# Patient Record
Sex: MALE
[De-identification: names, ages, dates, MRNs, and addresses within clinical notes are randomized per-mention and may not be internally consistent; named-entity substitution may affect disease eponyms.]

---

## 2019-12-13 ENCOUNTER — HOSPITAL ENCOUNTER (EMERGENCY)
Dept: HOSPITAL 97 - ER | Age: 39
Discharge: HOME | End: 2019-12-13
Payer: COMMERCIAL

## 2019-12-13 VITALS — OXYGEN SATURATION: 98 % | TEMPERATURE: 97.5 F | DIASTOLIC BLOOD PRESSURE: 87 MMHG | SYSTOLIC BLOOD PRESSURE: 121 MMHG

## 2019-12-13 DIAGNOSIS — F17.210: ICD-10-CM

## 2019-12-13 DIAGNOSIS — N13.2: Primary | ICD-10-CM

## 2019-12-13 LAB
ALBUMIN SERPL BCP-MCNC: 4.3 G/DL (ref 3.4–5)
ALP SERPL-CCNC: 58 U/L (ref 45–117)
ALT SERPL W P-5'-P-CCNC: 56 U/L (ref 12–78)
AST SERPL W P-5'-P-CCNC: 21 U/L (ref 15–37)
BUN BLD-MCNC: 20 MG/DL (ref 7–18)
GLUCOSE SERPLBLD-MCNC: 144 MG/DL (ref 74–106)
HCT VFR BLD CALC: 45.4 % (ref 39.6–49)
LIPASE SERPL-CCNC: 280 U/L (ref 73–393)
LYMPHOCYTES # SPEC AUTO: 2.9 K/UL (ref 0.7–4.9)
PMV BLD: 8.2 FL (ref 7.6–11.3)
POTASSIUM SERPL-SCNC: 4.1 MMOL/L (ref 3.5–5.1)
RBC # BLD: 4.66 M/UL (ref 4.33–5.43)

## 2019-12-13 PROCEDURE — 96361 HYDRATE IV INFUSION ADD-ON: CPT

## 2019-12-13 PROCEDURE — 99284 EMERGENCY DEPT VISIT MOD MDM: CPT

## 2019-12-13 PROCEDURE — 80076 HEPATIC FUNCTION PANEL: CPT

## 2019-12-13 PROCEDURE — 74176 CT ABD & PELVIS W/O CONTRAST: CPT

## 2019-12-13 PROCEDURE — 36415 COLL VENOUS BLD VENIPUNCTURE: CPT

## 2019-12-13 PROCEDURE — 96375 TX/PRO/DX INJ NEW DRUG ADDON: CPT

## 2019-12-13 PROCEDURE — 81003 URINALYSIS AUTO W/O SCOPE: CPT

## 2019-12-13 PROCEDURE — 80048 BASIC METABOLIC PNL TOTAL CA: CPT

## 2019-12-13 PROCEDURE — 96366 THER/PROPH/DIAG IV INF ADDON: CPT

## 2019-12-13 PROCEDURE — 85025 COMPLETE CBC W/AUTO DIFF WBC: CPT

## 2019-12-13 PROCEDURE — 96365 THER/PROPH/DIAG IV INF INIT: CPT

## 2019-12-13 PROCEDURE — 83690 ASSAY OF LIPASE: CPT

## 2019-12-13 PROCEDURE — 76377 3D RENDER W/INTRP POSTPROCES: CPT

## 2019-12-13 NOTE — EDPHYS
Physician Documentation                                                                           

 [facility]                                                                 

Name: Darin Rosas                                                                             

Age: 39 yrs                                                                                       

Sex: Male                                                                                         

: 1980                                                                                   

MRN: [id number]                                                                                   

Arrival Date: 2019                                                                          

Time: 12:45                                                                                       

Account#: [id number]                                                                            

Bed 5                                                                                             

Private MD:                                                                                       

ED Physician Jack Eric                                                                      

HPI:                                                                                              

                                                                                             

14:13 This 39 yrs old  Male presents to ER via Ambulatory with complaints of          corina 

      Abdominal Pain.                                                                             

14:13 The patient presents with abdominal pain abdominal distention in the left lower         corina 

      quadrant. Onset: The symptoms/episode began/occurred just prior to arrival. The             

      symptoms radiate to the left flank. Associated signs and symptoms: none. The symptoms       

      are described as sharp. Modifying factors: The symptoms are alleviated by nothing, the      

      symptoms are aggravated by nothing. Severity of pain: At its worst the pain was severe      

      in the emergency department the pain is unchanged. The patient has not experienced          

      similar symptoms in the past.                                                               

                                                                                                  

Historical:                                                                                       

- Allergies:                                                                                      

13:19 No Known Allergies;                                                                     ss  

- Home Meds:                                                                                      

13:19 None [Active];                                                                          ss  

- PMHx:                                                                                           

13:19 None;                                                                                   ss  

- PSHx:                                                                                           

13:19 None;                                                                                   ss  

                                                                                                  

- Immunization history:: Adult Immunizations unknown.                                             

- Social history:: Smoking status: Patient uses tobacco products, smokes one pack                 

  cigarettes per day.                                                                             

- Ebola Screening: : Patient denies exposure to infectious person Patient denies travel           

  to an Ebola-affected area in the 21 days before illness onset.                                  

- Family history:: not pertinent.                                                                 

                                                                                                  

                                                                                                  

ROS:                                                                                              

14:13 Constitutional: Negative for fever, chills, and weight loss, Eyes: Negative for injury, corina 

      pain, redness, and discharge, ENT: Negative for injury, pain, and discharge, Neck:          

      Negative for injury, pain, and swelling, Cardiovascular: Negative for chest pain,           

      palpitations, and edema, Respiratory: Negative for shortness of breath, cough,              

      wheezing, and pleuritic chest pain, Back: Negative for injury and pain, : Negative        

      for injury, bleeding, discharge, and swelling, MS/Extremity: Negative for injury and        

      deformity, Skin: Negative for injury, rash, and discoloration, Neuro: Negative for          

      headache, weakness, numbness, tingling, and seizure, Psych: Negative for depression,        

      anxiety, suicide ideation, homicidal ideation, and hallucinations, Allergy/Immunology:      

      Negative for hives, rash, and allergies, Endocrine: Negative for neck swelling,             

      polydipsia, polyuria, polyphagia, and marked weight changes, Hematologic/Lymphatic:         

      Negative for swollen nodes, abnormal bleeding, and unusual bruising.                        

14:13 Abdomen/GI: Positive for abdominal pain, of the left lower quadrant.                        

                                                                                                  

Exam:                                                                                             

14:13 Constitutional:  This is a well developed, well nourished patient who is awake, alert,  corina 

      and in no acute distress. Head/Face:  Normocephalic, atraumatic. Eyes:  Pupils equal        

      round and reactive to light, extra-ocular motions intact.  Lids and lashes normal.          

      Conjunctiva and sclera are non-icteric and not injected.  Cornea within normal limits.      

      Periorbital areas with no swelling, redness, or edema. ENT:  Nares patent. No nasal         

      discharge, no septal abnormalities noted.  Tympanic membranes are normal and external       

      auditory canals are clear.  Oropharynx with no redness, swelling, or masses, exudates,      

      or evidence of obstruction, uvula midline.  Mucous membranes moist. Neck:  Trachea          

      midline, no thyromegaly or masses palpated, and no cervical lymphadenopathy.  Supple,       

      full range of motion without nuchal rigidity, or vertebral point tenderness.  No            

      Meningismus. Chest/axilla:  Normal chest wall appearance and motion.  Nontender with no     

      deformity.  No lesions are appreciated. Cardiovascular:  Regular rate and rhythm with a     

      normal S1 and S2.  No gallops, murmurs, or rubs.  Normal PMI, no JVD.  No pulse             

      deficits. Respiratory:  Lungs have equal breath sounds bilaterally, clear to                

      auscultation and percussion.  No rales, rhonchi or wheezes noted.  No increased work of     

      breathing, no retractions or nasal flaring. Back:  No spinal tenderness.  No                

      costovertebral tenderness.  Full range of motion. Male :  Normal genitalia with no        

      discharge or lesions. Skin:  Warm, dry with normal turgor.  Normal color with no            

      rashes, no lesions, and no evidence of cellulitis. MS/ Extremity:  Pulses equal, no         

      cyanosis.  Neurovascular intact.  Full, normal range of motion. Neuro:  Awake and           

      alert, GCS 15, oriented to person, place, time, and situation.  Cranial nerves II-XII       

      grossly intact.  Motor strength 5/5 in all extremities.  Sensory grossly intact.            

      Cerebellar exam normal.  Normal gait. Psych:  Awake, alert, with orientation to person,     

      place and time.  Behavior, mood, and affect are within normal limits.                       

14:13 Abdomen/GI: Inspection: distension, Bowel sounds: normal, Palpation: moderate abdominal     

      tenderness, Liver: no appreciated palpable abnormalities, Hernia: not appreciated.          

                                                                                                  

Vital Signs:                                                                                      

13:19  / 92; Pulse 78; Resp 24; Temp 97.0(TE); Pulse Ox 95% on R/A; Weight 106.59 kg;   ss  

      Height 5 ft. 7 in. (170.18 cm); Pain 10/10;                                                 

14:30  / 89; Pulse 73; Resp 16; Pulse Ox 95% ;                                          ph  

15:30  / 78; Pulse 71; Resp 16; Pulse Ox 99% on R/A;                                    ph  

16:44  / 87; Pulse 72; Resp 18; Temp 97.5; Pulse Ox 98% on R/A; Pain 1/10;              ph  

13:19 Body Mass Index 36.81 (106.59 kg, 170.18 cm)                                              

                                                                                                  

MDM:                                                                                              

13:06 Patient medically screened.                                                             [facility] 

                                                                                                  

                                                                                             

13:12 Order name: Basic Metabolic Panel; Complete Time: 13:51                                 [facility] 

                                                                                             

13:12 Order name: CBC with Diff; Complete Time: 13:51                                         [facility] 

                                                                                             

13:12 Order name: Creatinine for Radiology; Complete Time: 13:51                              [facility] 

                                                                                             

13:12 Order name: Hepatic Function; Complete Time: 13:51                                      [facility] 

                                                                                             

13:12 Order name: Lipase; Complete Time: 13:51                                                [facility] 

                                                                                             

13:40 Order name: Urine Dipstick--Ancillary (enter results); Complete Time: 14:08             em1 

                                                                                             

13:12 Order name: CT Stone Protocol                                                           [facility] 

                                                                                             

13:12 Order name: IV Saline Lock; Complete Time: 13:15                                        [facility] 

                                                                                             

13:12 Order name: Labs collected and sent; Complete Time: 13:15                               [facility] 

                                                                                                  

Administered Medications:                                                                         

13:30 Drug: NS 0.9% 1000 ml Route: IV; Rate: 1 bolus; Site: left antecubital;                 ph  

16:50 Follow up: Response: No adverse reaction; IV Status: Completed infusion; IV Intake:     ph  

      1000ml                                                                                      

13:31 Drug: TORadol 30 mg Route: IVP; Site: left antecubital;                                 ph  

14:00 Follow up: Response: No adverse reaction; Pain is unchanged, physician notified         ph  

13:31 Drug: morphine 4 mg Route: IVP; Site: left antecubital;                                 ph  

14:00 Follow up: Response: No adverse reaction; Pain is unchanged, physician notified; RASS:  ph  

      Restless (+1); initial RASS +1                                                              

13:31 Drug: Zofran 4 mg Route: IVP; Site: left antecubital;                                   ph  

16:10 Follow up: Response: No adverse reaction                                                ph  

14:40 Drug: Dilaudid 1 mg Route: IVP; Site: left antecubital;                                 sg  

16:10 Follow up: Response: No adverse reaction; Pain is decreased; RASS: Light sedation (-2); ph  

      initial RASS +1                                                                             

14:40 Drug: Zofran 4 mg Route: IVP; Site: left antecubital;                                   sg  

16:10 Follow up: Response: No adverse reaction                                                ph  

14:40 Drug: Flomax 0.4 mg Route: PO;                                                          sg  

16:17 Follow up: Response: No adverse reaction                                                ph  

14:41 Drug: Rocephin 1 grams Route: IV; Rate: per protocol; Site: left antecubital;           sg  

16:17 Follow up: Response: No adverse reaction; IV Status: Completed infusion                 ph  

16:50 Not Given (Other Intervention Used): NS 0.9% 1000 ml IV at 1 bolus Per protocol; 1000   ph  

      mL bolus                                                                                    

                                                                                                  

                                                                                                  

Disposition:                                                                                      

19 14:20 Discharged to Home. Impression: Hydronephrosis with renal and ureteral             

  calculous obstruction - 2 MM UVJ.                                                               

- Condition is Stable.                                                                            

- Discharge Instructions: Kidney Stones, Kidney Stones, Easy-to-Read, Hydronephrosis,             

  Dietary Guidelines to Help Prevent Kidney Stones.                                               

- Prescriptions for Tylenol- Codeine #3 300-30 mg Oral Tablet - take 2 tablet by ORAL             

  route every 6 hours As needed; 30 tablet. Zofran 4 mg Oral Tablet - take 1 tablet by            

  ORAL route every 12 hours As needed; 20 tablet. Flomax 0.4 mg Oral Capsule, Sust.               

  Release 24 hr - take 1 capsule by ORAL route once daily 1/2 hour following the same             

  meal each day; 30 capsule. Cipro 500 mg Oral Tablet - take 1 tablet by ORAL route               

  every 12 hours for 7 days; 14 tablet.                                                           

- Medication Reconciliation Form, Thank You Letter, Antibiotic Education, Prescription            

  Opioid Use form.                                                                                

- Follow up: Private Physician; When: 2 - 3 days; Reason: Recheck today's complaints,             

  Continuance of care, Re-evaluation by your physician. Follow up: Ry Moran MD;           

  When: 2 - 3 days; Reason: Recheck today's complaints, Continuance of care,                      

  Re-evaluation by your physician.                                                                

- Problem is new.                                                                                 

- Symptoms have improved.                                                                         

                                                                                                  

                                                                                                  

                                                                                                  

Signatures:                                                                                       

Dispatcher [employer]                           EDMS                                                 

Carl Davila RN                         RN   Jack Crenshaw MD MD cha Smirch, Shelby, RN RN                                                      

Dorothea Cardoza RN                      RN   ph                                                   

                                                                                                  

Corrections: (The following items were deleted from the chart)                                    

16:51 14:20 2019 14:20 Discharged to Home. Impression: Hydronephrosis with renal and    ph  

      ureteral calculous obstruction - 2 MM UVJ. Condition is Stable. Forms are Medication        

      Reconciliation Form, Thank You Letter, Antibiotic Education, Prescription Opioid Use.       

      Follow up: Private Physician; When: 2 - 3 days; Reason: Recheck today's complaints,         

      Continuance of care, Re-evaluation by your physician. Follow up: Ry Moran; When:      

      2 - 3 days; Reason: Recheck today's complaints, Continuance of care, Re-evaluation by       

      your physician. Problem is new. Symptoms have improved. corina                                 

                                                                                                  

**************************************************************************************************

## 2019-12-13 NOTE — RAD REPORT
EXAM DESCRIPTION:  CT - Stone Protocol - 12/13/2019 1:52 pm

 

CLINICAL HISTORY:  Abdominal pain.

 

COMPARISON:  None.

 

TECHNIQUE:  Computed axial tomography of the abdomen pelvis was obtained without oral or IV contrast.
 Lack of IV and oral contrast limits evaluation of solid organs, bowel, and vessels. Coronal reformat
linda images were obtained and reviewed.

 

All CT scans are performed using dose optimization technique as appropriate and may include automated
 exposure control or mA/KV adjustment according to patient size.

 

FINDINGS:  Small bilateral renal calculi. Minimal left hydronephrosis. 2 millimeter calculus at the l
eft ureteral vesicle junction.

 

The liver, spleen, pancreas and adrenals appear grossly normal

 

There is no evidence of diverticulitis. The appendix appears normal

 

Small umbilical hernia contains fat

 

IMPRESSION:  2 millimeter calculus left ureterovesical junction resulting in minimal left hydronephro
sis

## 2019-12-13 NOTE — ER
Nurse's Notes                                                                                     

 [facility]                                                                 

Name: Darin Rosas                                                                             

Age: 39 yrs                                                                                       

Sex: Male                                                                                         

: 1980                                                                                   

MRN: [id number]                                                                                   

Arrival Date: 2019                                                                          

Time: 12:45                                                                                       

Account#: [id number]                                                                            

Bed 5                                                                                             

Private MD:                                                                                       

Diagnosis: Hydronephrosis with renal and ureteral calculous obstruction-2 MM UVJ                  

                                                                                                  

Presentation:                                                                                     

                                                                                             

13:05 Presenting complaint: Patient states: LLQ pain that radiates towards L testicle, with   ss  

      nausea. Began 2 hours ago. Transition of care: patient was not received from another        

      setting of care. Onset of symptoms was 2019. Risk Assessment: Do you want      

      to hurt yourself or someone else? Patient reports no desire to harm self or others.         

      Initial Sepsis Screen: Does the patient meet any 2 criteria? RR > 20 per min. Does the      

      patient have a suspected source of infection? No. Patient's initial sepsis screen is        

      negative. Care prior to arrival: None.                                                      

13:05 Acuity: CARLOS 2                                                                           ss  

13:05 Method Of Arrival: Ambulatory                                                           ss  

                                                                                                  

Historical:                                                                                       

- Allergies:                                                                                      

13:19 No Known Allergies;                                                                     ss  

- Home Meds:                                                                                      

13:19 None [Active];                                                                          ss  

- PMHx:                                                                                           

13:19 None;                                                                                   ss  

- PSHx:                                                                                           

13:19 None;                                                                                   ss  

                                                                                                  

- Immunization history:: Adult Immunizations unknown.                                             

- Social history:: Smoking status: Patient uses tobacco products, smokes one pack                 

  cigarettes per day.                                                                             

- Ebola Screening: : Patient denies exposure to infectious person Patient denies travel           

  to an Ebola-affected area in the 21 days before illness onset.                                  

- Family history:: not pertinent.                                                                 

                                                                                                  

                                                                                                  

Screenin:33 Abuse screen: Denies threats or abuse. Denies injuries from another. Nutritional        ph  

      screening: No deficits noted. Tuberculosis screening: No symptoms or risk factors           

      identified. Fall Risk None identified.                                                      

                                                                                                  

Assessment:                                                                                       

13:31 General: Appears in no apparent distress. uncomfortable, Behavior is cooperative,       ph  

      appropriate for age, restless. Pain: Complains of pain in left flank and left lower         

      quadrant. Neuro: Level of Consciousness is awake, alert, obeys commands, Oriented to        

      person, place, time, situation. Cardiovascular: Capillary refill < 3 seconds in             

      bilateral fingers Patient's skin is warm and dry. Respiratory: Airway is patent             

      Respiratory effort is even, unlabored, Respiratory pattern is regular, symmetrical. GI:     

      Abdomen is round non-distended, Bowel sounds present X 4 quads. Reports lower abdominal     

      pain, constipation, nausea, Patient currently denies vomiting. : Reports pain in left     

      flank(s), lower quadrant(s) Denies inability to void. Derm: Skin is intact, Skin is         

      pink, warm \T\ dry. Musculoskeletal: Circulation, motion, and sensation intact. Range of    

      motion: intact in all extremities.                                                          

14:30 Reassessment: Patient appears in no apparent distress at this time. Patient and/or      ph  

      family updated on plan of care and expected duration. Pain level reassessed. Patient is     

      alert, oriented x 3, equal unlabored respirations, skin warm/dry/pink. Pt remains           

      restless, c/o pain 8/10, denies nausea, ERP notified, see MAR.                              

16:05 Reassessment: Patient appears in no apparent distress at this time. Patient and/or      ph  

      family updated on plan of care and expected duration. Pain level reassessed. Pt asleep      

      w/ equal and unlabored respirations, awakens easily, reports that pain has decreased,       

      quickly falls back to sleep, pt did receive IV narcotics so will continue to monitor        

      pt, will d/c when more awake and alert.                                                     

                                                                                                  

Vital Signs:                                                                                      

13:19  / 92; Pulse 78; Resp 24; Temp 97.0(TE); Pulse Ox 95% on R/A; Weight 106.59 kg;   ss  

      Height 5 ft. 7 in. (170.18 cm); Pain 10/10;                                                 

14:30  / 89; Pulse 73; Resp 16; Pulse Ox 95% ;                                          ph  

15:30  / 78; Pulse 71; Resp 16; Pulse Ox 99% on R/A;                                    ph  

16:44  / 87; Pulse 72; Resp 18; Temp 97.5; Pulse Ox 98% on R/A; Pain 1/10;              ph  

13:19 Body Mass Index 36.81 (106.59 kg, 170.18 cm)                                              

                                                                                                  

ED Course:                                                                                        

12:45 Patient arrived in ED.                                                                  as  

13:06 Jack Eric MD is Attending Physician.                                             [facility] 

13:12 Dorothea Cardoza, RN is Primary Nurse.                                                      

13:18 Triage completed.                                                                         

13:19 Arm band placed on right wrist.                                                           

13:33 Patient has correct armband on for positive identification. Bed in low position. Call   ph  

      light in reach. Side rails up X 1. Pulse ox on. NIBP on. Door closed. Noise minimized.      

      Warm blanket given.                                                                         

13:33 Inserted saline lock: 20 gauge in left antecubital area, using aseptic technique. Blood ph  

      collected.                                                                                  

13:52 CT Stone Protocol In Process Unspecified.                                               EDMS

14:16 Ry Moran MD is Referral Physician.                                              [facility] 

16:17 No provider procedures requiring assistance completed.                                  ph  

16:46 IV discontinued, intact, bleeding controlled, No redness/swelling at site. Pressure     ph  

      dressing applied.                                                                           

                                                                                                  

Administered Medications:                                                                         

13:30 Drug: NS 0.9% 1000 ml Route: IV; Rate: 1 bolus; Site: left antecubital;                 ph  

16:50 Follow up: Response: No adverse reaction; IV Status: Completed infusion; IV Intake:     ph  

      1000ml                                                                                      

13:31 Drug: TORadol 30 mg Route: IVP; Site: left antecubital;                                 ph  

14:00 Follow up: Response: No adverse reaction; Pain is unchanged, physician notified         ph  

13:31 Drug: morphine 4 mg Route: IVP; Site: left antecubital;                                 ph  

14:00 Follow up: Response: No adverse reaction; Pain is unchanged, physician notified; RASS:  ph  

      Restless (+1); initial RASS +1                                                              

13:31 Drug: Zofran 4 mg Route: IVP; Site: left antecubital;                                   ph  

16:10 Follow up: Response: No adverse reaction                                                ph  

14:40 Drug: Dilaudid 1 mg Route: IVP; Site: left antecubital;                                 sg  

16:10 Follow up: Response: No adverse reaction; Pain is decreased; RASS: Light sedation (-2); ph  

      initial RASS +1                                                                             

14:40 Drug: Zofran 4 mg Route: IVP; Site: left antecubital;                                   sg  

16:10 Follow up: Response: No adverse reaction                                                ph  

14:40 Drug: Flomax 0.4 mg Route: PO;                                                          sg  

16:17 Follow up: Response: No adverse reaction                                                ph  

14:41 Drug: Rocephin 1 grams Route: IV; Rate: per protocol; Site: left antecubital;           sg  

16:17 Follow up: Response: No adverse reaction; IV Status: Completed infusion                 ph  

16:50 Not Given (Other Intervention Used): NS 0.9% 1000 ml IV at 1 bolus Per protocol; 1000   ph  

      mL bolus                                                                                    

                                                                                                  

                                                                                                  

Intake:                                                                                           

16:50 IV: 1000ml; Total: 1000ml.                                                              ph  

                                                                                                  

Outcome:                                                                                          

14:20 Discharge ordered by MD. arriaga 

16:46 Discharged to home ambulatory.                                                          ph  

16:46 Condition: improved                                                                         

16:46 Discharge instructions given to patient, Instructed on discharge instructions, follow       

      up and referral plans. medication usage, Demonstrated understanding of instructions,        

      follow-up care, medications, Prescriptions given X 4.                                       

16:51 Patient left the ED.                                                                    ph  

                                                                                                  

Signatures:                                                                                       

Dispatcher [employer]                           EDMS                                                 

Carl Davila RN                         RN   Jack Crenshaw MD MD cha Martinez, Amelia as Smirch, Shelby, RN RN   ss                                                   

Dorothea Cardoza RN                      RN   ph                                                   

                                                                                                  

Corrections: (The following items were deleted from the chart)                                    

16:10 16:10 Response: No adverse reaction; Pain is decreased; RASS: Light sedation (-2) ph    ph  

                                                                                                  

**************************************************************************************************